# Patient Record
Sex: FEMALE | Race: WHITE | URBAN - METROPOLITAN AREA
[De-identification: names, ages, dates, MRNs, and addresses within clinical notes are randomized per-mention and may not be internally consistent; named-entity substitution may affect disease eponyms.]

---

## 2017-01-01 ENCOUNTER — TELEPHONE (OUTPATIENT)
Dept: FAMILY MEDICINE CLINIC | Facility: CLINIC | Age: 82
End: 2017-01-01

## 2017-01-01 ENCOUNTER — OFFICE VISIT (OUTPATIENT)
Dept: FAMILY MEDICINE CLINIC | Facility: CLINIC | Age: 82
End: 2017-01-01

## 2017-01-01 ENCOUNTER — LAB ENCOUNTER (OUTPATIENT)
Dept: LAB | Age: 82
End: 2017-01-01
Attending: FAMILY MEDICINE
Payer: MEDICARE

## 2017-01-01 ENCOUNTER — LAB ENCOUNTER (OUTPATIENT)
Dept: LAB | Age: 82
End: 2017-01-01
Attending: NURSE PRACTITIONER
Payer: MEDICARE

## 2017-01-01 ENCOUNTER — APPOINTMENT (OUTPATIENT)
Dept: LAB | Age: 82
End: 2017-01-01
Attending: FAMILY MEDICINE
Payer: MEDICARE

## 2017-01-01 VITALS
WEIGHT: 125.25 LBS | RESPIRATION RATE: 14 BRPM | BODY MASS INDEX: 26.29 KG/M2 | DIASTOLIC BLOOD PRESSURE: 64 MMHG | HEART RATE: 62 BPM | HEIGHT: 58 IN | TEMPERATURE: 98 F | SYSTOLIC BLOOD PRESSURE: 128 MMHG

## 2017-01-01 VITALS
DIASTOLIC BLOOD PRESSURE: 62 MMHG | TEMPERATURE: 97 F | SYSTOLIC BLOOD PRESSURE: 128 MMHG | HEART RATE: 58 BPM | BODY MASS INDEX: 28.34 KG/M2 | WEIGHT: 135 LBS | RESPIRATION RATE: 14 BRPM | HEIGHT: 58 IN

## 2017-01-01 VITALS
BODY MASS INDEX: 26.87 KG/M2 | HEART RATE: 92 BPM | TEMPERATURE: 100 F | RESPIRATION RATE: 16 BRPM | WEIGHT: 128 LBS | DIASTOLIC BLOOD PRESSURE: 90 MMHG | HEIGHT: 58 IN | SYSTOLIC BLOOD PRESSURE: 138 MMHG

## 2017-01-01 VITALS
SYSTOLIC BLOOD PRESSURE: 108 MMHG | DIASTOLIC BLOOD PRESSURE: 62 MMHG | HEART RATE: 62 BPM | BODY MASS INDEX: 25.82 KG/M2 | RESPIRATION RATE: 16 BRPM | WEIGHT: 123 LBS | TEMPERATURE: 99 F | HEIGHT: 58 IN

## 2017-01-01 DIAGNOSIS — F03.91 DEMENTIA WITH BEHAVIORAL DISTURBANCE, UNSPECIFIED DEMENTIA TYPE (HCC): ICD-10-CM

## 2017-01-01 DIAGNOSIS — E11.9 CONTROLLED TYPE 2 DIABETES MELLITUS WITHOUT COMPLICATION, WITHOUT LONG-TERM CURRENT USE OF INSULIN (HCC): Primary | ICD-10-CM

## 2017-01-01 DIAGNOSIS — E11.9 DIABETES MELLITUS TYPE II, CONTROLLED (HCC): Primary | ICD-10-CM

## 2017-01-01 DIAGNOSIS — E55.9 VITAMIN D DEFICIENCY: ICD-10-CM

## 2017-01-01 DIAGNOSIS — A49.9 UTI (URINARY TRACT INFECTION), BACTERIAL: Primary | ICD-10-CM

## 2017-01-01 DIAGNOSIS — R41.0 CONFUSION: Primary | ICD-10-CM

## 2017-01-01 DIAGNOSIS — R41.0 CONFUSION: ICD-10-CM

## 2017-01-01 DIAGNOSIS — I10 ESSENTIAL HYPERTENSION: ICD-10-CM

## 2017-01-01 DIAGNOSIS — Z00.00 ROUTINE GENERAL MEDICAL EXAMINATION AT A HEALTH CARE FACILITY: Primary | ICD-10-CM

## 2017-01-01 DIAGNOSIS — R35.0 URINARY FREQUENCY: ICD-10-CM

## 2017-01-01 DIAGNOSIS — R30.0 DYSURIA: ICD-10-CM

## 2017-01-01 DIAGNOSIS — N39.0 UTI (URINARY TRACT INFECTION), BACTERIAL: Primary | ICD-10-CM

## 2017-01-01 DIAGNOSIS — E11.9 CONTROLLED TYPE 2 DIABETES MELLITUS WITHOUT COMPLICATION, WITHOUT LONG-TERM CURRENT USE OF INSULIN (HCC): ICD-10-CM

## 2017-01-01 DIAGNOSIS — R30.0 DYSURIA: Primary | ICD-10-CM

## 2017-01-01 DIAGNOSIS — E11.8 CONTROLLED TYPE 2 DIABETES MELLITUS WITH COMPLICATION, WITHOUT LONG-TERM CURRENT USE OF INSULIN (HCC): ICD-10-CM

## 2017-01-01 DIAGNOSIS — M81.0 OSTEOPOROSIS: ICD-10-CM

## 2017-01-01 DIAGNOSIS — N39.0 RECURRENT UTI: Primary | ICD-10-CM

## 2017-01-01 LAB
25-HYDROXYVITAMIN D (TOTAL): 8.2 NG/ML (ref 30–100)
ALBUMIN SERPL-MCNC: 3.2 G/DL (ref 3.5–4.8)
ALBUMIN SERPL-MCNC: 3.5 G/DL (ref 3.5–4.8)
ALP LIVER SERPL-CCNC: 102 U/L (ref 55–142)
ALP LIVER SERPL-CCNC: 99 U/L (ref 55–142)
ALT SERPL-CCNC: 17 U/L (ref 14–54)
ALT SERPL-CCNC: 18 U/L (ref 14–54)
AST SERPL-CCNC: 15 U/L (ref 15–41)
AST SERPL-CCNC: 18 U/L (ref 15–41)
BASOPHILS # BLD AUTO: 0.04 X10(3) UL (ref 0–0.1)
BASOPHILS NFR BLD AUTO: 0.3 %
BILIRUB SERPL-MCNC: 0.3 MG/DL (ref 0.1–2)
BILIRUB SERPL-MCNC: 0.4 MG/DL (ref 0.1–2)
BILIRUB UR QL STRIP.AUTO: NEGATIVE
BUN BLD-MCNC: 15 MG/DL (ref 8–20)
BUN BLD-MCNC: 17 MG/DL (ref 8–20)
CALCIUM BLD-MCNC: 8.8 MG/DL (ref 8.3–10.3)
CALCIUM BLD-MCNC: 9.1 MG/DL (ref 8.3–10.3)
CHLORIDE: 103 MMOL/L (ref 101–111)
CHLORIDE: 103 MMOL/L (ref 101–111)
CHOLEST SMN-MCNC: 311 MG/DL (ref ?–200)
CO2: 25 MMOL/L (ref 22–32)
CO2: 30 MMOL/L (ref 22–32)
COLOR UR AUTO: YELLOW
CREAT BLD-MCNC: 0.72 MG/DL (ref 0.55–1.02)
CREAT BLD-MCNC: 0.96 MG/DL (ref 0.55–1.02)
EOSINOPHIL # BLD AUTO: 0.06 X10(3) UL (ref 0–0.3)
EOSINOPHIL NFR BLD AUTO: 0.5 %
ERYTHROCYTE [DISTWIDTH] IN BLOOD BY AUTOMATED COUNT: 14.4 % (ref 11.5–16)
EST. AVERAGE GLUCOSE BLD GHB EST-MCNC: 160 MG/DL (ref 68–126)
GLUCOSE BLD-MCNC: 136 MG/DL (ref 70–99)
GLUCOSE BLD-MCNC: 145 MG/DL (ref 70–99)
GLUCOSE UR STRIP.AUTO-MCNC: NEGATIVE MG/DL
HBA1C MFR BLD HPLC: 7.2 % (ref ?–5.7)
HCT VFR BLD AUTO: 42.6 % (ref 34–50)
HDLC SERPL-MCNC: 78 MG/DL (ref 45–?)
HDLC SERPL: 3.99 {RATIO} (ref ?–4.44)
HGB BLD-MCNC: 13.4 G/DL (ref 12–16)
IMMATURE GRANULOCYTE COUNT: 0.04 X10(3) UL (ref 0–1)
IMMATURE GRANULOCYTE RATIO %: 0.3 %
KETONES UR STRIP.AUTO-MCNC: NEGATIVE MG/DL
LDLC SERPL CALC-MCNC: 212 MG/DL (ref ?–130)
LYMPHOCYTES # BLD AUTO: 1.8 X10(3) UL (ref 0.9–4)
LYMPHOCYTES NFR BLD AUTO: 15.1 %
M PROTEIN MFR SERPL ELPH: 6.8 G/DL (ref 6.1–8.3)
M PROTEIN MFR SERPL ELPH: 7.5 G/DL (ref 6.1–8.3)
MCH RBC QN AUTO: 29.5 PG (ref 27–33.2)
MCHC RBC AUTO-ENTMCNC: 31.5 G/DL (ref 31–37)
MCV RBC AUTO: 93.8 FL (ref 81–100)
MONOCYTES # BLD AUTO: 0.83 X10(3) UL (ref 0.1–0.6)
MONOCYTES NFR BLD AUTO: 6.9 %
NEUTROPHIL ABS PRELIM: 9.18 X10 (3) UL (ref 1.3–6.7)
NEUTROPHILS # BLD AUTO: 9.18 X10(3) UL (ref 1.3–6.7)
NEUTROPHILS NFR BLD AUTO: 76.9 %
NITRITE UR QL STRIP.AUTO: NEGATIVE
NONHDLC SERPL-MCNC: 233 MG/DL (ref ?–130)
PH UR STRIP.AUTO: 6.5 [PH] (ref 4.5–8)
PLATELET # BLD AUTO: 174 10(3)UL (ref 150–450)
POTASSIUM SERPL-SCNC: 3.9 MMOL/L (ref 3.6–5.1)
POTASSIUM SERPL-SCNC: 4.4 MMOL/L (ref 3.6–5.1)
PROT UR STRIP.AUTO-MCNC: NEGATIVE MG/DL
RBC # BLD AUTO: 4.54 X10(6)UL (ref 3.8–5.1)
RED CELL DISTRIBUTION WIDTH-SD: 49.6 FL (ref 35.1–46.3)
SODIUM SERPL-SCNC: 135 MMOL/L (ref 136–144)
SODIUM SERPL-SCNC: 138 MMOL/L (ref 136–144)
SP GR UR STRIP.AUTO: 1.01 (ref 1–1.03)
TRIGLYCERIDES: 105 MG/DL (ref ?–150)
TSI SER-ACNC: 2.8 MIU/ML (ref 0.35–5.5)
UROBILINOGEN UR STRIP.AUTO-MCNC: 0.2 MG/DL
VLDL: 21 MG/DL (ref 5–40)
WBC # BLD AUTO: 12 X10(3) UL (ref 4–13)

## 2017-01-01 PROCEDURE — 84443 ASSAY THYROID STIM HORMONE: CPT

## 2017-01-01 PROCEDURE — 80061 LIPID PANEL: CPT

## 2017-01-01 PROCEDURE — 36415 COLL VENOUS BLD VENIPUNCTURE: CPT | Performed by: NURSE PRACTITIONER

## 2017-01-01 PROCEDURE — G0439 PPPS, SUBSEQ VISIT: HCPCS | Performed by: FAMILY MEDICINE

## 2017-01-01 PROCEDURE — 83036 HEMOGLOBIN GLYCOSYLATED A1C: CPT | Performed by: FAMILY MEDICINE

## 2017-01-01 PROCEDURE — 83036 HEMOGLOBIN GLYCOSYLATED A1C: CPT

## 2017-01-01 PROCEDURE — 36415 COLL VENOUS BLD VENIPUNCTURE: CPT | Performed by: FAMILY MEDICINE

## 2017-01-01 PROCEDURE — 99397 PER PM REEVAL EST PAT 65+ YR: CPT | Performed by: FAMILY MEDICINE

## 2017-01-01 PROCEDURE — 82306 VITAMIN D 25 HYDROXY: CPT

## 2017-01-01 PROCEDURE — 85025 COMPLETE CBC W/AUTO DIFF WBC: CPT | Performed by: NURSE PRACTITIONER

## 2017-01-01 PROCEDURE — 99214 OFFICE O/P EST MOD 30 MIN: CPT | Performed by: FAMILY MEDICINE

## 2017-01-01 PROCEDURE — 80053 COMPREHEN METABOLIC PANEL: CPT | Performed by: NURSE PRACTITIONER

## 2017-01-01 PROCEDURE — 80053 COMPREHEN METABOLIC PANEL: CPT | Performed by: FAMILY MEDICINE

## 2017-01-01 PROCEDURE — 80053 COMPREHEN METABOLIC PANEL: CPT

## 2017-01-01 PROCEDURE — 99214 OFFICE O/P EST MOD 30 MIN: CPT | Performed by: NURSE PRACTITIONER

## 2017-01-01 PROCEDURE — 96160 PT-FOCUSED HLTH RISK ASSMT: CPT | Performed by: FAMILY MEDICINE

## 2017-01-01 PROCEDURE — 82306 VITAMIN D 25 HYDROXY: CPT | Performed by: FAMILY MEDICINE

## 2017-01-01 RX ORDER — CIPROFLOXACIN 500 MG/1
500 TABLET, FILM COATED ORAL 2 TIMES DAILY
Qty: 14 TABLET | Refills: 0 | Status: SHIPPED | OUTPATIENT
Start: 2017-01-01 | End: 2017-01-01

## 2017-01-01 RX ORDER — IBANDRONATE SODIUM 150 MG/1
TABLET, FILM COATED ORAL
Qty: 3 TABLET | Refills: 3 | Status: SHIPPED | OUTPATIENT
Start: 2017-01-01

## 2017-01-01 RX ORDER — IBANDRONATE SODIUM 150 MG/1
1 TABLET, FILM COATED ORAL
COMMUNITY
Start: 2017-01-01 | End: 2017-01-01

## 2017-01-01 RX ORDER — IBANDRONATE SODIUM 3 MG/3 ML
3 SYRINGE (ML) INTRAVENOUS ONCE
COMMUNITY
End: 2017-01-01

## 2017-01-01 RX ORDER — CEPHALEXIN 250 MG/5ML
250 POWDER, FOR SUSPENSION ORAL 3 TIMES DAILY
Qty: 150 ML | Refills: 0 | Status: SHIPPED | OUTPATIENT
Start: 2017-01-01 | End: 2017-01-01

## 2017-01-01 RX ORDER — LISINOPRIL 20 MG/1
20 TABLET ORAL DAILY
COMMUNITY
End: 2017-01-01

## 2017-01-01 RX ORDER — LISINOPRIL 20 MG/1
TABLET ORAL
Qty: 90 TABLET | Refills: 3 | Status: SHIPPED | OUTPATIENT
Start: 2017-01-01

## 2017-03-31 NOTE — PATIENT INSTRUCTIONS
Good exam today     Continue with current medications. Add vit D (OTC) one tab daily ( 1000 units)     Recheck in 4 months.

## 2017-03-31 NOTE — PROGRESS NOTES
[address]  PROGRESS NOTE  Chief Complaint:   Patient presents with: Follow - Up: Was unable to perform the extended cognitive assessment      HPI:   This is a 80year old female coming in for wellness exam along with diabetes recheck. not repeat    Counseling given: Not Answered       REVIEW OF SYSTEMS:   CONSTITUTIONAL:  Denies , fever, chills, or fatigue,unusual weight gain/loss  EENT:  Eyes:  Denies eye pain, visual changes, blurred vision, double vision .    Ears, Nose, Throat:  Haily Saini thyromegaly. SKIN: No rashes, no skin lesion, no bruising, good turgor. HEART:  Regular rate and rhythm, no murmurs,   LUNGS: Clear to auscultation bilterally, no rales/rhonchi/wheezing.     ABDOMEN:  Soft, nondistended, nontender, bowel sounds normal i Lavon Gaines MD  3/31/2017  5:17 PM

## 2017-06-09 NOTE — TELEPHONE ENCOUNTER
Fazal Lima from [facility] in [address] states she wanted to know if Dr. Kenneth Bethea would sign a plan of care for the patient. I confirmed that Dr. Kenneth Bethea is the patient's  pcp and will sign her plan of care.

## 2017-06-16 NOTE — TELEPHONE ENCOUNTER
Almaz Sinclair from [facility] would like a verbal if the patient is or can take vitamin D3 daily. Please advise.

## 2017-06-20 NOTE — TELEPHONE ENCOUNTER
Misty Contreras from Physical Therapy states he has not heard if the patient should continue taking the vitamin D3, please advise.

## 2017-07-03 NOTE — TELEPHONE ENCOUNTER
If patient can be by 2:45 today, I will see her. Otherwise she needs to go to  walkin clinic or the ER.  Eda Holder, 07/03/17, 1:04 PM

## 2017-07-03 NOTE — PROGRESS NOTES
HPI:    Patient ID: Claudia Morel is a 80year old female. HPI    Had a UTI infection a few weeks ago. Was on Cipro from the ER. Was better. Now getting the same symptoms as before. Also states that she has a ST that started when she got to the office. Musculoskeletal:   Ambulates slowly with walker   Neurological: She is alert. Skin: Skin is warm and dry. Psychiatric: She has a normal mood and affect. Her behavior is normal. Judgment and thought content normal.   Nursing note and vitals reviewed.

## 2017-07-03 NOTE — TELEPHONE ENCOUNTER
Александр Nieves (daughter) states that the patient fell last night while trying to go to the bathroom, no injuries reported. She states that the patient is more confused and would like to have a urine checked.   She states that [id number] can check a urine but n

## 2017-07-04 NOTE — PATIENT INSTRUCTIONS
Start antibiotic and take till gone. Labs pending. If symptoms worsen, go to the ER. Return in 10 days for urine specimen to ensure resolution.

## 2017-07-05 NOTE — TELEPHONE ENCOUNTER
----- Message from ALIYAH De La Cruz sent at 7/4/2017  9:31 AM CDT -----  Labs look ok. Still need to get urine specimen post antibiotic.  Eda Holder, 07/04/17, 9:31 AM

## 2017-07-05 NOTE — TELEPHONE ENCOUNTER
Patient's daughter Maryjane Manuel informed of the below results and recommendations. Daughter states patient will be seeing Dr. Darius Davis on 7/14/2017 for a f/u after being on the antibiotics.   Betsy Warren, 07/05/17, 8:16 AM

## 2017-07-10 NOTE — TELEPHONE ENCOUNTER
Daughter states that she made an appointment for a recheck of the patient's urine on this Friday but that will only be the 10th day. She states that she would like to cancel that appointment and bring a urine on Mondy (7/17/17) for a recheck.  She states t

## 2017-07-18 NOTE — TELEPHONE ENCOUNTER
Spoke with daughter, Lorre Gowers and advised that the order for the culture was put in this morning and that the specimen is going out for culture today. I advised that results should be back tomorrow and that as soon as they are back we will give a call.

## 2017-07-18 NOTE — TELEPHONE ENCOUNTER
Let Magui Hewitt know that we got the specimen and orders in today and it is going out with our lab this afternoon. We should have results back tomorrow and I advised that we would give a call when they are available.

## 2017-07-18 NOTE — TELEPHONE ENCOUNTER
Daughter brought in urine sample on 7/17/17- was kept in the fridge overnight in the lab. Per Librado Villela, [facility] orders were placed for a culture on 7/18/18.

## 2017-07-19 NOTE — TELEPHONE ENCOUNTER
Bhupendra Every informed that we only have the preliminary results back and the final results will be back tomorrow.  Shira Rodrigues, 07/19/17, 3:56 PM

## 2017-07-20 NOTE — TELEPHONE ENCOUNTER
----- Message from ALIYAH Mercado sent at 7/20/2017  9:28 AM CDT -----  Urine is positive for 2 types of bacteria. Since she was just on a course of Keflex, will put her on Cipro 500 mg twice daily for 7 days.  Please let patient or caregiver know an

## 2017-07-20 NOTE — TELEPHONE ENCOUNTER
Patient informed of the below results and recommendations. Patient would like script faxed to [facility], [place] (pended). Please advise.  Xavier Kinsey, 07/20/17, 9:36 AM

## 2017-07-20 NOTE — TELEPHONE ENCOUNTER
Copied from result note - Urine is positive for 2 types of bacteria. Since she was just on a course of Keflex, will put her on Cipro 500 mg twice daily for 7 days. Please let patient or caregiver know and verify where to send the prescription.  Need to rec

## 2017-08-03 NOTE — TELEPHONE ENCOUNTER
Orders printed and faxed to [facility]. Message left on daughters VM letting her know that orders have been sent over.     Nilda Antonio, 08/03/17, 3:45 PM

## 2017-08-03 NOTE — TELEPHONE ENCOUNTER
Mother had UTI needs order to recheck . Please fax order to [facility] so they can collect sample for daughter Earlyne Math to bring in. Bryan Watt

## 2017-08-11 NOTE — TELEPHONE ENCOUNTER
----- Message from ALIYAH Galo sent at 8/11/2017  8:17 AM CDT -----  Urine culture is negative. Please let patient know. Thank you.  Eda Holder, 08/11/17, 8:17 AM

## 2017-08-11 NOTE — TELEPHONE ENCOUNTER
No answer and VM states to enter your remote access code which I don't have. Unable to leave a message.  Javier Muniz, 08/11/17, 8:19 AM

## 2017-08-26 NOTE — PROGRESS NOTES
Chief Complaint:   Patient presents with: Follow - Up: 4 month follow up      HPI:   This is a 80year old female coming in for f/u care. Patient with diabetes - controlled with metformin.   Last HAI - 7.2   Has been doing fairly well with spouse at [facility] 1 tablet by mouth every 30 (thirty) days. Disp:  Rfl:    lisinopril 20 MG Oral Tab Take 20 mg by mouth daily. Disp:  Rfl:    MetFORMIN HCl 1000 MG Oral Tab Take 1 tablet by mouth daily.  Disp:  Rfl:         Allergies:    Penicillin G            Rash  Meloxi Mouth:  No oral lesions or ulcerations, good dentition. NECK: Supple,  no JVD, no thyromegaly. SKIN: No rashes, no skin lesion, no bruising, good turgor.     HEART:  Regular rate and rhythm, no murmurs,   LUNGS: Clear to auscultation bilterally, no r

## 2017-09-25 NOTE — TELEPHONE ENCOUNTER
Future appt:     Your appointments     Date & Time Appointment Department [facility])    Nov 13, 2017  8:45 AM CST Laboratory Visit with [facility]F [facility] ([facility])    Nov 20, 2017 10:00 AM CST Follow up - Extended with Jamie Tavera

## 2017-10-30 NOTE — TELEPHONE ENCOUNTER
Kelsi Desouza (nurse) from [facility] states the paitent fell today at approx. 3:45pm while using her walker. Becca Efrain states that she thinks she had a tiring day due to the patient going to her sister's  and was just worn out.     Becca Zuniga states t

## 2017-11-09 ENCOUNTER — CHARTING TRANS (OUTPATIENT)
Dept: OTHER | Age: 82
End: 2017-11-09

## 2017-11-09 NOTE — TELEPHONE ENCOUNTER
----- Message from Nish Hung sent at 11/8/2017  9:35 AM CST -----  Regarding: lab orders needed   Patient has lab appointment on 11/13/17 could you please put lab orders in system.         Thanks,  Thea

## 2017-11-12 PROBLEM — E55.9 VITAMIN D DEFICIENCY: Status: ACTIVE | Noted: 2017-01-01

## 2017-11-20 NOTE — PROGRESS NOTES
Chief Complaint:   Patient presents with:  Diabetes: 3 month f/u      HPI:   This is a 80year old female coming in for follow-up care regarding her diabetes along with vitamin D deficiency.     Patient takes her medication regularly she lives with her [facility] TAKE 1 TABLET EVERY MORNING Disp: 90 tablet Rfl: 3   Ibandronate Sodium 150 MG Oral Tab Take 1 tablet by mouth every 30 (thirty) days. Disp:  Rfl:    MetFORMIN HCl 1000 MG Oral Tab Take 1 tablet by mouth daily.  Disp:  Rfl:         Allergies:    Penicillin LUNGS: Clear to auscultation bilterally, no rales/rhonchi/wheezing. ABDOMEN:  Soft, nondistended, nontender, bowel sounds normal in all 4 quadrants, no masses, no hepatosplenomegaly.   BACK: No tenderness, no spasm,     EXTREMITIES:  No edema, no cyano

## 2017-12-06 NOTE — TELEPHONE ENCOUNTER
Future appt:     Your appointments     Date & Time Appointment Department [facility])    May 21, 2018 10:30 AM CDT Follow up - Extended with Addie March MD [address] [place] [address], [facility]        [facility]

## 2017-12-18 NOTE — TELEPHONE ENCOUNTER
Future appt: Your appointments     Date & Time Appointment Department [facility])    May 21, 2018 10:30 AM CDT Follow up - Extended with Scott Leon MD [address] [place] [address] [facility] ([facility])        [facility], [address] [place] [address] Group [facility] [id number] [phone]        Last Appointment:  11/20/2017    Cholesterol, Total (mg/dL)   Date Value   03/27/2017 311 (H)   ----------  HDL Cholesterol (mg/dL)   Date Value   03/27/2017 78   ----------  LDL Cholesterol (mg/dL)   Date Value   03/27/2017 212 (H)   ----------  Triglycerides (mg/dL)   Date Value   03/27/2017 105   ----------    Lab Results  Component Value Date    (H) 11/13/2017   A1C 6.9 (H) 11/13/2017       Lab Results  Component Value Date   TSH 2.800 03/27/2017       No Follow-up on file.   Future Appointments  Date Time Provider Caleb Stein   5/21/2018 10:30 AM Scott Leon MD [facility] [place]

## 2018-01-01 ENCOUNTER — TELEPHONE (OUTPATIENT)
Dept: FAMILY MEDICINE CLINIC | Facility: CLINIC | Age: 83
End: 2018-01-01
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
## 2018-01-01 DIAGNOSIS — M62.89 MUSCULAR IMBALANCE: ICD-10-CM

## 2018-01-26 NOTE — TELEPHONE ENCOUNTER
Daughter informed of referral to [facility] and order was faxed. Daughter thanked me for the call.

## 2018-01-26 NOTE — TELEPHONE ENCOUNTER
----- Message from Kaykay Kidd sent at 1/26/2018 12:23 PM CST -----  2nd thiago niurka    @ [phone]    RE:   Order for PT    Fax to [facility]  Fax [phone]       Kaykay Kidd, 01/26/18, 12:24 PM        Call back afterwards to confirm this

## 2018-01-26 NOTE — TELEPHONE ENCOUNTER
Daughter states that [facility] would like the order for physical therapy faxed to them because they use a certain company that comes to the facilty. Fax number [phone]    Order for physical therapy faxed to [facility].

## 2018-01-26 NOTE — TELEPHONE ENCOUNTER
Daughter Tianna Jaimes) states that the patient went to the ER on Sunday due to a fall at [facility]. She states that the hospital did a CT scan of the head, and checked for UTI. All the testing was negative.  She states that the patient is not able to stand

## 2018-01-30 NOTE — TELEPHONE ENCOUNTER
Daughter calling states mother will be transitioning to [facility], states she currently resides at the [facility]. Daughter specifically would like to speak to Dr. Elisa Spencer regarding mother's care.    Informed daughter Dr. Elisa Spencer is out of the offic

## 2018-01-31 NOTE — TELEPHONE ENCOUNTER
Winslow Must from Admissions at [facility] care states that she will need a current history and physical, list of medications and an order to admit (stating that she is to be admitted to a skilled nursing facility with current medications). Please advise.

## 2018-01-31 NOTE — TELEPHONE ENCOUNTER
Daughter Rudi Reese) states that the patient is going to be transitioning to [facility]. She states that they have an opening for short term care avaiable to get her in sooner and as soon as something else opens they can move her.   She would like to

## 2018-01-31 NOTE — TELEPHONE ENCOUNTER
Patient informed of new referral.    Referral and progress notes were faxed to Dr. Yuko Meeks in [facility].

## 2018-02-20 NOTE — TELEPHONE ENCOUNTER
Would recommend less invasive less aggressive measures. Call out to [address] [place]  - message left.

## 2018-02-20 NOTE — TELEPHONE ENCOUNTER
Jewels Cornejo, residing at [facility],  has been sick for last 4-5 days. Her appetite has been decreasing and yesterday and today will not eat at all. This morning she is in bed and nurse can only get her to take 1 pill.      Her most recent vitals:   108/68  100.4  9

## 2018-02-24 ENCOUNTER — TELEPHONE (OUTPATIENT)
Dept: FAMILY MEDICINE CLINIC | Facility: CLINIC | Age: 83
End: 2018-02-24

## 2018-04-12 ENCOUNTER — TELEPHONE (OUTPATIENT)
Dept: FAMILY MEDICINE CLINIC | Facility: CLINIC | Age: 83
End: 2018-04-12

## 2018-04-12 NOTE — TELEPHONE ENCOUNTER
Spoke with Taye from [facility] care Notified we do not have office visit notes from December or January. [facility] is requesting last ofv in November. Please advise okay to fax?

## 2018-11-02 VITALS — TEMPERATURE: 97.8 F

## (undated) NOTE — MR AVS SNAPSHOT
[address]  [place] [address] [id number] [phone]               Thank you for choosing us for your health care visit with Leeroy Lu MD.  We are glad to serve you and happy to provide you with this summary of If you have questions, you can call [phone] to talk to our [facility] Staff. Remember, [facility] [employer] is NOT to be used for urgent needs. For medical emergencies, dial 911.         Educational Information     TOP FALL PREVENTION TIPS    INSIDE YOUR HO Fully enjoy your food when eating. Don’t eat while distracted and slow down. Avoid over sized portions. Don’t eat while when you’re bored.      EAT THESE FOODS MORE OFTEN: EAT THESE FOODS LESS OFTEN:   Make half your plate fruits and vegetables Highly